# Patient Record
Sex: MALE | Race: BLACK OR AFRICAN AMERICAN | Employment: OTHER | URBAN - METROPOLITAN AREA
[De-identification: names, ages, dates, MRNs, and addresses within clinical notes are randomized per-mention and may not be internally consistent; named-entity substitution may affect disease eponyms.]

---

## 2022-06-23 ENCOUNTER — OFFICE VISIT (OUTPATIENT)
Dept: CARDIOTHORACIC SURGERY | Facility: CLINIC | Age: 67
End: 2022-06-23
Payer: MEDICARE

## 2022-06-23 VITALS
HEIGHT: 73 IN | OXYGEN SATURATION: 98 % | WEIGHT: 208.88 LBS | HEART RATE: 85 BPM | BODY MASS INDEX: 27.68 KG/M2 | DIASTOLIC BLOOD PRESSURE: 83 MMHG | RESPIRATION RATE: 18 BRPM | SYSTOLIC BLOOD PRESSURE: 132 MMHG

## 2022-06-23 DIAGNOSIS — I50.42 CHRONIC COMBINED SYSTOLIC AND DIASTOLIC HEART FAILURE: Primary | ICD-10-CM

## 2022-06-23 PROCEDURE — 99205 OFFICE O/P NEW HI 60 MIN: CPT | Mod: S$GLB,,, | Performed by: THORACIC SURGERY (CARDIOTHORACIC VASCULAR SURGERY)

## 2022-06-23 PROCEDURE — 99205 PR OFFICE/OUTPT VISIT, NEW, LEVL V, 60-74 MIN: ICD-10-PCS | Mod: S$GLB,,, | Performed by: THORACIC SURGERY (CARDIOTHORACIC VASCULAR SURGERY)

## 2022-06-23 RX ORDER — NITROGLYCERIN 0.4 MG/1
TABLET SUBLINGUAL
COMMUNITY
Start: 2022-02-02

## 2022-06-23 RX ORDER — FUROSEMIDE 40 MG/1
40 TABLET ORAL 2 TIMES DAILY
COMMUNITY
Start: 2022-06-07

## 2022-06-23 RX ORDER — METOPROLOL TARTRATE 25 MG/1
12.5 TABLET, FILM COATED ORAL 2 TIMES DAILY
COMMUNITY
Start: 2022-06-20

## 2022-06-23 RX ORDER — ASPIRIN 81 MG/1
81 TABLET ORAL DAILY
COMMUNITY

## 2022-06-23 RX ORDER — FENOFIBRIC ACID 45 MG/1
1 CAPSULE, DELAYED RELEASE ORAL DAILY
COMMUNITY
Start: 2022-06-09

## 2022-06-23 RX ORDER — PANTOPRAZOLE SODIUM 40 MG/1
40 TABLET, DELAYED RELEASE ORAL DAILY
COMMUNITY
Start: 2022-06-09

## 2022-06-23 RX ORDER — AMIODARONE HYDROCHLORIDE 200 MG/1
200 TABLET ORAL DAILY
COMMUNITY
Start: 2022-05-25

## 2022-06-23 RX ORDER — PRAVASTATIN SODIUM 80 MG/1
80 TABLET ORAL NIGHTLY
COMMUNITY
Start: 2022-06-20

## 2022-06-24 NOTE — PROGRESS NOTES
Subjective:      Patient ID: Marbin Sloan is a 66 y.o. male who presents for evaluation of CAD    Chief Complaint: Heart Failure (Left Ventricular Ejection Fraction <30 %)    66 year year old gentleman that presents today for evaluation of CAD and heart failure with ventricular ejection fraction 31%. Patient underwent 2 hospitalizations since April 2022 with GI bleed and onset of Atrial fibulation. Reports during these episodes, he experienced syncope and shortness of breath. Denies chest pain at this time. Reports bilateral lower extremity edema worsening x 1 month. No history of miocardial infarcation or CVA. Mother history of heart disease. Risk factors for coranry artery disease include age, gender, hyperlipidemia, hypertension, and diabetes mellitus.       Review of Systems   Constitutional: Negative.   Eyes: Negative.    Cardiovascular: Positive for leg swelling and syncope.   Respiratory: Negative.    Endocrine: Negative.    Hematologic/Lymphatic: Negative.    Skin: Negative.    Musculoskeletal: Negative.    Psychiatric/Behavioral: Negative.       Past Medical History:   Diagnosis Date    Atrial fibrillation     CAD (coronary artery disease)     DM (diabetes mellitus)     GI bleed 04/2022    HTN (hypertension)     Hyperlipidemia       No past surgical history on file.   Family History   Problem Relation Age of Onset    Heart disease Mother       Social History     Socioeconomic History    Marital status: Unknown   Tobacco Use    Smoking status: Former Smoker     Years: 20.00    Smokeless tobacco: Never Used   Substance and Sexual Activity    Alcohol use: Yes     Alcohol/week: 3.0 - 4.0 standard drinks     Types: 3 - 4 Cans of beer per week     Comment: Week        Medication List with Changes/Refills   Current Medications    AMIODARONE (PACERONE) 200 MG TAB    Take 200 mg by mouth once daily.    ASPIRIN (ECOTRIN) 81 MG EC TABLET    Take 81 mg by mouth once daily.    FENOFIBRIC ACID (FIBRICOR)  "45 MG CPDR    Take 1 capsule by mouth once daily.    FUROSEMIDE (LASIX) 40 MG TABLET    TAKE 1 TABLET BY MOUTH ONCE PER DAY. TAKE AN ADDITIONAL DOSE IN EVENING IF NEEDED FOR SWELLING    METOPROLOL TARTRATE (LOPRESSOR) 25 MG TABLET    Take 12.5 mg by mouth 2 (two) times daily.    NITROGLYCERIN (NITROSTAT) 0.4 MG SL TABLET    PLACE 1 TABLET UNDER TONGUE THREE TIMES A DAY AS NEEDED    PANTOPRAZOLE (PROTONIX) 40 MG TABLET    Take 40 mg by mouth once daily.    PRAVASTATIN (PRAVACHOL) 80 MG TABLET    Take 80 mg by mouth every evening.        Objective:     /83   Pulse 85   Resp 18   Ht 6' 1" (1.854 m)   Wt 94.8 kg (208 lb 14.4 oz)   SpO2 98%   BMI 27.56 kg/m²     Physical Exam  Constitutional:       General: He is not in acute distress.     Appearance: Normal appearance. He is obese. He is not ill-appearing, toxic-appearing or diaphoretic.   HENT:      Head: Normocephalic and atraumatic.      Nose: Nose normal.      Mouth/Throat:      Mouth: Mucous membranes are dry.   Eyes:      Extraocular Movements: Extraocular movements intact.      Conjunctiva/sclera: Conjunctivae normal.      Pupils: Pupils are equal, round, and reactive to light.   Cardiovascular:      Rate and Rhythm: Tachycardia present. Rhythm irregular.      Pulses: Normal pulses.      Heart sounds: Normal heart sounds. No murmur heard.    No gallop.   Pulmonary:      Effort: Pulmonary effort is normal. No respiratory distress.      Breath sounds: Normal breath sounds. No wheezing, rhonchi or rales.   Chest:      Chest wall: No tenderness.   Abdominal:      General: Abdomen is flat. Bowel sounds are normal. There is no distension.      Palpations: Abdomen is soft.      Tenderness: There is no abdominal tenderness. There is no right CVA tenderness or guarding.   Musculoskeletal:         General: No swelling, tenderness, deformity or signs of injury. Normal range of motion.      Cervical back: Normal range of motion.      Right lower leg: No edema.    "   Left lower leg: No edema.   Skin:     General: Skin is warm and dry.      Capillary Refill: Capillary refill takes more than 3 seconds.      Findings: No bruising or erythema.   Neurological:      General: No focal deficit present.      Mental Status: He is alert. Mental status is at baseline. He is disoriented.      Sensory: No sensory deficit.      Motor: No weakness.      Coordination: Coordination normal.      Gait: Gait normal.   Psychiatric:         Mood and Affect: Mood normal.          Labs:  CMP  No results found for: NA, K, CL, CO2, GLU, BUN, CREATININE, CALCIUM, PROT, ALBUMIN, BILITOT, ALKPHOS, AST, ALT, ANIONGAP, ESTGFRAFRICA, EGFRNONAA   No results found for: ALT, AST, GGT, ALKPHOS, BILITOT   No results found for: PTINR, PT  No results found for: WBC, HGB, HCT, MCV, LABPLAT    No results found for: BLOODTYPE    Imaging:  No results found for this or any previous visit.     No results found for this or any previous visit.      No image results found.     No image results found.       No results found for this visit on 06/23/22.   Coronary angiogram:  Diffuse calcified atheromatous lesions but no severe stenosis and it seems not different from the angiogram which was done in September 2021. Left ventricular ejection fraction is 31%  Carotid ultrasound.  That showed place systolic velocity in the right internal carotid artery was 367 cm per secondnd suggestive of greater than 70% stenosis by velocity criteria there was heavy calcification in the bilateral carotid bulbs    Echocardiogram in April 2022:  Severely depressed left ventricular function.  He elevated extraocular 31%.  Right ventricle is normal size, systolic pressure is elevate by Doppler in systole  It was 42 mmHg.  Mitral valve leaflets are normal is moderate to severe mitral regurgitation.  Ideally for static and mobile no stenosis or regurgitation  .    PET scan:  Medium area of moderate tracer attenuation in the posterior wall there is a  partially reversible.  Medium area of tracer attenuation in the anterior wall  Laboratory data:  Glucose 119.  BUN was 17.8.  Creatinine 1.5 .  Electrolytes are within normal limits.  White blood cell count was 7.3 Hey.  Hemoglobin 9.6.  HEMATOCRIT 32.1.  PLATELETS 222.    Pulmonary Functions Testing Results:    No results found for: FEV1, FVC, [id number], TLC, DLCO         Assessment & Plan:     66-YEAR-OLD GENTLEMAN WITH A KNOWN HISTORY OF CORONARY ARTERY DISEASE.  APPARENTLY HAD STENTING DONE IN THE RIGHT CORONARY ARTERY.  NOW HE PRESENTS WITH SHORTNESS OF BREATH.  HE HAD ANGIOGRAM OF THE CORONARIES THAT IN MY READ DOES NOT EXPLAIN HIS OVERALL LV EJECTION FRACTION AND THE SEVERITY OF CORONARY ARTERY DISEASE.  ECHOCARDIOGRAM SHOWED MODERATE TO SEVERE MITRAL REGURGITATION.  I RECOMMENDED SHOULD HAVE THE TRANSESOPHAGEAL ECHOCARDIOGRAM AND EVALUATE HIS MITRAL REGURGITATION.  HIS LV DYSFUNCTION HE IS OUT OF PROPORTION TO CORONARY ARTERY DISEASE.  I THINK AFTER ECHOCARDIOGRAM WHO CAN RECOMMEND OPTIMAL MEDICAL MANAGEMENT AND RE-EVALUATE HIS MITRAL REGURGITATION BEFORE SUBJECTING HIM TO OPEN HEART SURGERY.  REGARDING CAROTID ULTRASOUND, HE HAVE SIGNIFICANT CAROTID ARTERY DISEASE.  He is waiting to get his CT of the neck..  I recommend him to have the transesophageal echocardiogram and manage carotid artery disease non operatively.  Regarding the CT of the neck if it confirms high-grade stenosis in the right carotid artery, I recommend carotid endarterectomy

## 2022-07-18 ENCOUNTER — OFFICE VISIT (OUTPATIENT)
Dept: CARDIOTHORACIC SURGERY | Facility: CLINIC | Age: 67
End: 2022-07-18
Payer: MEDICARE

## 2022-07-18 VITALS
BODY MASS INDEX: 24.25 KG/M2 | HEART RATE: 71 BPM | SYSTOLIC BLOOD PRESSURE: 122 MMHG | WEIGHT: 183 LBS | OXYGEN SATURATION: 95 % | HEIGHT: 73 IN | DIASTOLIC BLOOD PRESSURE: 76 MMHG

## 2022-07-18 DIAGNOSIS — I50.42 CHRONIC COMBINED SYSTOLIC AND DIASTOLIC HEART FAILURE: Primary | ICD-10-CM

## 2022-07-18 PROCEDURE — 99214 OFFICE O/P EST MOD 30 MIN: CPT | Mod: S$GLB,,, | Performed by: THORACIC SURGERY (CARDIOTHORACIC VASCULAR SURGERY)

## 2022-07-18 PROCEDURE — 99214 PR OFFICE/OUTPT VISIT, EST, LEVL IV, 30-39 MIN: ICD-10-PCS | Mod: S$GLB,,, | Performed by: THORACIC SURGERY (CARDIOTHORACIC VASCULAR SURGERY)

## 2022-07-18 NOTE — PROGRESS NOTES
Subjective:      Patient ID: Marbin Sloan is a 66 y.o. male who presents for evaluation of CAD, A-fib and valve disease, Left ventricular EF < 30 %.  Chief Complaint: Valvular Heart Disease, Coronary Artery Disease, Carotid Artery Disease, and Atrial Fibrillation    66 year year old gentleman that presents today for evaluation of CAD and heart failure with ventricular ejection fraction 31%. Patient underwent 2 hospitalizations since April 2022 with GI bleed and onset of Atrial fibulation. Reports during these episodes, he experienced syncope and shortness of breath. Denies chest pain at this time. Reports bilateral lower extremity edema has been stable since his last appointment;  he is still on Lasix.  No history of miocardial infarcation or CVA. Mother history of heart disease. Risk factors for coranry artery disease include age, gender, hyperlipidemia, hypertension, and diabetes mellitus.       Coronary Artery Disease  Pertinent negatives include no leg swelling.   Atrial Fibrillation  Past medical history includes atrial fibrillation and CAD.     Review of Systems   Constitutional: Negative.   HENT: Negative.    Eyes: Negative.    Cardiovascular: Negative.  Negative for leg swelling.   Respiratory: Negative.    Endocrine: Negative.    Skin: Negative.    Musculoskeletal: Negative.    Gastrointestinal: Negative.    Genitourinary: Negative.    Neurological: Negative.    Psychiatric/Behavioral: Negative.       Past Medical History:   Diagnosis Date    Atrial fibrillation     CAD (coronary artery disease)     DM (diabetes mellitus)     GI bleed 04/2022    HTN (hypertension)     Hyperlipidemia       History reviewed. No pertinent surgical history.   Family History   Problem Relation Age of Onset    Heart disease Mother       Social History     Socioeconomic History    Marital status: Unknown   Tobacco Use    Smoking status: Former Smoker     Years: 20.00    Smokeless tobacco: Never Used   Substance and  "Sexual Activity    Alcohol use: Yes     Alcohol/week: 3.0 - 4.0 standard drinks     Types: 3 - 4 Cans of beer per week     Comment: Week        Medication List with Changes/Refills   Current Medications    AMIODARONE (PACERONE) 200 MG TAB    Take 200 mg by mouth once daily.    ASPIRIN (ECOTRIN) 81 MG EC TABLET    Take 81 mg by mouth once daily.    FENOFIBRIC ACID (FIBRICOR) 45 MG CPDR    Take 1 capsule by mouth once daily.    FUROSEMIDE (LASIX) 40 MG TABLET    TAKE 1 TABLET BY MOUTH ONCE PER DAY. TAKE AN ADDITIONAL DOSE IN EVENING IF NEEDED FOR SWELLING    METOPROLOL TARTRATE (LOPRESSOR) 25 MG TABLET    Take 12.5 mg by mouth 2 (two) times daily.    NITROGLYCERIN (NITROSTAT) 0.4 MG SL TABLET    PLACE 1 TABLET UNDER TONGUE THREE TIMES A DAY AS NEEDED    PANTOPRAZOLE (PROTONIX) 40 MG TABLET    Take 40 mg by mouth once daily.    PRAVASTATIN (PRAVACHOL) 80 MG TABLET    Take 80 mg by mouth every evening.        Objective:     /76 (BP Location: Left arm) Comment (BP Location): L  Pulse 71   Ht 6' 1" (1.854 m)   Wt 83 kg (183 lb)   SpO2 95%   BMI 24.14 kg/m²     Physical Exam  Constitutional:       General: He is not in acute distress.     Appearance: Normal appearance. He is obese. He is not ill-appearing, toxic-appearing or diaphoretic.   HENT:      Head: Normocephalic and atraumatic.      Nose: Nose normal.      Mouth/Throat:      Mouth: Mucous membranes are dry.   Eyes:      Extraocular Movements: Extraocular movements intact.      Conjunctiva/sclera: Conjunctivae normal.      Pupils: Pupils are equal, round, and reactive to light.   Cardiovascular:      Rate and Rhythm: Tachycardia present. Rhythm irregular.      Pulses: Normal pulses.      Heart sounds: Normal heart sounds. No murmur heard.    No gallop.   Pulmonary:      Effort: Pulmonary effort is normal. No respiratory distress.      Breath sounds: Normal breath sounds. No wheezing, rhonchi or rales.   Chest:      Chest wall: No tenderness.   Abdominal: "      General: Abdomen is flat. Bowel sounds are normal. There is no distension.      Palpations: Abdomen is soft.      Tenderness: There is no abdominal tenderness. There is no right CVA tenderness or guarding.   Musculoskeletal:         General: No swelling, tenderness, deformity or signs of injury. Normal range of motion.      Cervical back: Normal range of motion.      Right lower leg: No edema.      Left lower leg: No edema.   Skin:     General: Skin is warm and dry.      Capillary Refill: Capillary refill takes more than 3 seconds.      Findings: No bruising or erythema.   Neurological:      General: No focal deficit present.      Mental Status: He is alert. Mental status is at baseline. He is disoriented.      Sensory: No sensory deficit.      Motor: No weakness.      Coordination: Coordination normal.      Gait: Gait normal.   Psychiatric:         Mood and Affect: Mood normal.          Labs:  CMP  No results found for: NA, K, CL, CO2, GLU, BUN, CREATININE, CALCIUM, PROT, ALBUMIN, BILITOT, ALKPHOS, AST, ALT, ANIONGAP, ESTGFRAFRICA, EGFRNONAA   No results found for: ALT, AST, GGT, ALKPHOS, BILITOT   No results found for: PTINR, PT  No results found for: WBC, HGB, HCT, MCV, LABPLAT    No results found for: BLOODTYPE    Imaging:   JO 7/13/22 : Rhythm is normal sinus. Left ventricle appears dilated with mildly depressed systolic function and mild global hypokinesis, EF estimated at 40-50 %.  Left atrium appears dilated , left atrial appendage has a windsock morphology.  Moderate to severe Mitral regurgitation with 3 or 4 separate jets is seen.  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     TTE 4/2022:  Severely depressed LV functin. He elevated extraocular 31 %. RV is normal in size, systolic function is elevated by doppler in systole at 42 mmHg. Mitral valve leaflets are normal , moderate to severe MR. Ideally for stateic and mobile no stenosis or regurgitation.     [facility]  9/3/21: Diffuse calcified atheroma that causes no severe stenosis. Suspect PET scan abnormalities are due to small caliber PD and closure of the small obtuse marginal 1branch. Hemodynamically nonsignificant IFR ot the ostial LAD. LV diastolic dysfunction.    Carotid U/S 6/22/22: Left Internal carotid stenosis > 70 %,  Left external carotid artery velocity is IC/CC Ratio: 3.71.  Right Internal Carotid artery > 70 %,  Right external artery velocity : IC/CC Ratio: 5.25     Pulmonary Functions Testing Results:    Cardiac PET Scan 8/16/21: Moderately depressed LV function that is likely ischemic given anterior and posterior perfusion defects. Recommend evaluation for potentially multivessel CAD.    No results found for: FEV1, FVC, [id number], TLC, DLCO         Assessment & Plan:     66-year-old Afro-American gentleman with progressively getting short of breath.  He is in [place]  heart association functional class 3. Is the left shoulder dysfunction is definite out of proportion to his coronary artery disease.  It has been discussed with his cardiologist. He definitely comes in at high risk patient for mitral valve repair and coronary artery bypass grafting .  The risks and benefits of surgery were explained the patient.  The risks include but are not limited to bleeding, infection, renal failure, pulmonary failure, stroke and death.  He understood all risks and wished to pursue surgery           Randa Gann RN

## 2022-07-19 DIAGNOSIS — I48.0 PAROXYSMAL ATRIAL FIBRILLATION: ICD-10-CM

## 2022-07-19 DIAGNOSIS — I05.9 MITRAL VALVE DISEASE: ICD-10-CM

## 2022-07-19 DIAGNOSIS — I50.42 CHRONIC COMBINED SYSTOLIC AND DIASTOLIC HEART FAILURE: ICD-10-CM

## 2022-07-19 DIAGNOSIS — I25.10 CORONARY ARTERY DISEASE, UNSPECIFIED VESSEL OR LESION TYPE, UNSPECIFIED WHETHER ANGINA PRESENT, UNSPECIFIED WHETHER NATIVE OR TRANSPLANTED HEART: ICD-10-CM

## 2022-07-20 ENCOUNTER — TELEPHONE (OUTPATIENT)
Dept: CARDIOTHORACIC SURGERY | Facility: CLINIC | Age: 67
End: 2022-07-20
Payer: MEDICARE

## 2022-07-20 NOTE — TELEPHONE ENCOUNTER
----- Message from Mis Lerma sent at 7/20/2022  7:58 AM CDT -----  Contact: PT    ----- Message -----  From: Elana Ho  Sent: 7/19/2022  11:19 AM CDT  To: [facility] Staff        Who Called: Marbin       Does the patient know what this is regarding?: schedule surgery     Would the patient rather a call back or a response via [facility]? Callback     Best Call Back Number  [phone] (home)

## 2022-07-25 ENCOUNTER — OUTSIDE PLACE OF SERVICE (OUTPATIENT)
Dept: CARDIOTHORACIC SURGERY | Facility: CLINIC | Age: 67
End: 2022-07-25
Payer: MEDICARE

## 2022-07-25 PROCEDURE — 33508 PR ENDOSCOPY W/VIDEO-ASST VEIN HARVEST,CABG: ICD-10-PCS | Mod: 59,,, | Performed by: THORACIC SURGERY (CARDIOTHORACIC VASCULAR SURGERY)

## 2022-07-25 PROCEDURE — 33430 REPLACEMENT OF MITRAL VALVE: CPT | Mod: ,,, | Performed by: THORACIC SURGERY (CARDIOTHORACIC VASCULAR SURGERY)

## 2022-07-25 PROCEDURE — 33518 CABG ARTERY-VEIN TWO: CPT | Mod: ,,, | Performed by: THORACIC SURGERY (CARDIOTHORACIC VASCULAR SURGERY)

## 2022-07-25 PROCEDURE — 33508 ENDOSCOPIC VEIN HARVEST: CPT | Mod: 59,,, | Performed by: THORACIC SURGERY (CARDIOTHORACIC VASCULAR SURGERY)

## 2022-07-25 PROCEDURE — 33533 PR CABG, ARTERIAL, SINGLE: ICD-10-PCS | Mod: 51,,, | Performed by: THORACIC SURGERY (CARDIOTHORACIC VASCULAR SURGERY)

## 2022-07-25 PROCEDURE — 33430 PR REPLACEMENT OF MITRAL VALVE: ICD-10-PCS | Mod: ,,, | Performed by: THORACIC SURGERY (CARDIOTHORACIC VASCULAR SURGERY)

## 2022-07-25 PROCEDURE — 33518 PR CABG, ARTERY-VEIN, TWO: ICD-10-PCS | Mod: ,,, | Performed by: THORACIC SURGERY (CARDIOTHORACIC VASCULAR SURGERY)

## 2022-07-25 PROCEDURE — 33533 CABG ARTERIAL SINGLE: CPT | Mod: 51,,, | Performed by: THORACIC SURGERY (CARDIOTHORACIC VASCULAR SURGERY)

## 2022-07-29 ENCOUNTER — OUTSIDE PLACE OF SERVICE (OUTPATIENT)
Dept: CARDIOTHORACIC SURGERY | Facility: CLINIC | Age: 67
End: 2022-07-29
Payer: MEDICARE

## 2022-07-29 PROCEDURE — 99024 POSTOP FOLLOW-UP VISIT: CPT | Mod: ,,, | Performed by: THORACIC SURGERY (CARDIOTHORACIC VASCULAR SURGERY)

## 2022-07-29 PROCEDURE — 99024 PR POST-OP FOLLOW-UP VISIT: ICD-10-PCS | Mod: ,,, | Performed by: THORACIC SURGERY (CARDIOTHORACIC VASCULAR SURGERY)

## 2022-07-30 ENCOUNTER — OUTSIDE PLACE OF SERVICE (OUTPATIENT)
Dept: CARDIOTHORACIC SURGERY | Facility: CLINIC | Age: 67
End: 2022-07-30
Payer: MEDICARE

## 2022-07-30 PROCEDURE — 99024 POSTOP FOLLOW-UP VISIT: CPT | Mod: ,,, | Performed by: THORACIC SURGERY (CARDIOTHORACIC VASCULAR SURGERY)

## 2022-07-30 PROCEDURE — 99024 PR POST-OP FOLLOW-UP VISIT: ICD-10-PCS | Mod: ,,, | Performed by: THORACIC SURGERY (CARDIOTHORACIC VASCULAR SURGERY)

## 2022-07-31 ENCOUNTER — OUTSIDE PLACE OF SERVICE (OUTPATIENT)
Dept: CARDIOTHORACIC SURGERY | Facility: CLINIC | Age: 67
End: 2022-07-31
Payer: MEDICARE

## 2022-07-31 PROCEDURE — 99024 POSTOP FOLLOW-UP VISIT: CPT | Mod: ,,, | Performed by: THORACIC SURGERY (CARDIOTHORACIC VASCULAR SURGERY)

## 2022-07-31 PROCEDURE — 99024 PR POST-OP FOLLOW-UP VISIT: ICD-10-PCS | Mod: ,,, | Performed by: THORACIC SURGERY (CARDIOTHORACIC VASCULAR SURGERY)

## 2022-08-01 ENCOUNTER — OUTSIDE PLACE OF SERVICE (OUTPATIENT)
Dept: CARDIOTHORACIC SURGERY | Facility: CLINIC | Age: 67
End: 2022-08-01
Payer: MEDICARE

## 2022-08-01 PROCEDURE — 99024 POSTOP FOLLOW-UP VISIT: CPT | Mod: ,,, | Performed by: THORACIC SURGERY (CARDIOTHORACIC VASCULAR SURGERY)

## 2022-08-01 PROCEDURE — 99024 PR POST-OP FOLLOW-UP VISIT: ICD-10-PCS | Mod: ,,, | Performed by: THORACIC SURGERY (CARDIOTHORACIC VASCULAR SURGERY)

## 2022-08-02 ENCOUNTER — OUTSIDE PLACE OF SERVICE (OUTPATIENT)
Dept: CARDIOTHORACIC SURGERY | Facility: CLINIC | Age: 67
End: 2022-08-02
Payer: MEDICARE

## 2022-08-02 PROCEDURE — 99024 POSTOP FOLLOW-UP VISIT: CPT | Mod: ,,, | Performed by: THORACIC SURGERY (CARDIOTHORACIC VASCULAR SURGERY)

## 2022-08-02 PROCEDURE — 99024 PR POST-OP FOLLOW-UP VISIT: ICD-10-PCS | Mod: ,,, | Performed by: THORACIC SURGERY (CARDIOTHORACIC VASCULAR SURGERY)

## 2022-08-03 ENCOUNTER — OUTSIDE PLACE OF SERVICE (OUTPATIENT)
Dept: CARDIOTHORACIC SURGERY | Facility: CLINIC | Age: 67
End: 2022-08-03
Payer: MEDICARE

## 2022-08-03 PROCEDURE — 99024 PR POST-OP FOLLOW-UP VISIT: ICD-10-PCS | Mod: ,,, | Performed by: THORACIC SURGERY (CARDIOTHORACIC VASCULAR SURGERY)

## 2022-08-03 PROCEDURE — 99024 POSTOP FOLLOW-UP VISIT: CPT | Mod: ,,, | Performed by: THORACIC SURGERY (CARDIOTHORACIC VASCULAR SURGERY)

## 2022-08-04 ENCOUNTER — OUTSIDE PLACE OF SERVICE (OUTPATIENT)
Dept: CARDIOTHORACIC SURGERY | Facility: CLINIC | Age: 67
End: 2022-08-04
Payer: MEDICARE

## 2022-08-04 PROCEDURE — 99024 POSTOP FOLLOW-UP VISIT: CPT | Mod: ,,, | Performed by: THORACIC SURGERY (CARDIOTHORACIC VASCULAR SURGERY)

## 2022-08-04 PROCEDURE — 99024 PR POST-OP FOLLOW-UP VISIT: ICD-10-PCS | Mod: ,,, | Performed by: THORACIC SURGERY (CARDIOTHORACIC VASCULAR SURGERY)

## 2022-08-05 ENCOUNTER — OUTSIDE PLACE OF SERVICE (OUTPATIENT)
Dept: CARDIOTHORACIC SURGERY | Facility: CLINIC | Age: 67
End: 2022-08-05
Payer: MEDICARE

## 2022-08-05 PROCEDURE — 99024 PR POST-OP FOLLOW-UP VISIT: ICD-10-PCS | Mod: ,,, | Performed by: THORACIC SURGERY (CARDIOTHORACIC VASCULAR SURGERY)

## 2022-08-05 PROCEDURE — 99024 POSTOP FOLLOW-UP VISIT: CPT | Mod: ,,, | Performed by: THORACIC SURGERY (CARDIOTHORACIC VASCULAR SURGERY)

## 2022-08-06 ENCOUNTER — OUTSIDE PLACE OF SERVICE (OUTPATIENT)
Dept: CARDIOTHORACIC SURGERY | Facility: CLINIC | Age: 67
End: 2022-08-06
Payer: MEDICARE

## 2022-08-06 PROCEDURE — 99024 POSTOP FOLLOW-UP VISIT: CPT | Mod: ,,, | Performed by: THORACIC SURGERY (CARDIOTHORACIC VASCULAR SURGERY)

## 2022-08-06 PROCEDURE — 99024 PR POST-OP FOLLOW-UP VISIT: ICD-10-PCS | Mod: ,,, | Performed by: THORACIC SURGERY (CARDIOTHORACIC VASCULAR SURGERY)

## 2022-08-07 ENCOUNTER — OUTSIDE PLACE OF SERVICE (OUTPATIENT)
Dept: CARDIOTHORACIC SURGERY | Facility: CLINIC | Age: 67
End: 2022-08-07
Payer: MEDICARE

## 2022-08-07 PROCEDURE — 99024 POSTOP FOLLOW-UP VISIT: CPT | Mod: ,,, | Performed by: THORACIC SURGERY (CARDIOTHORACIC VASCULAR SURGERY)

## 2022-08-07 PROCEDURE — 99024 PR POST-OP FOLLOW-UP VISIT: ICD-10-PCS | Mod: ,,, | Performed by: THORACIC SURGERY (CARDIOTHORACIC VASCULAR SURGERY)

## 2022-08-08 ENCOUNTER — OUTSIDE PLACE OF SERVICE (OUTPATIENT)
Dept: CARDIOTHORACIC SURGERY | Facility: CLINIC | Age: 67
End: 2022-08-08
Payer: MEDICARE

## 2022-08-08 PROCEDURE — 99024 POSTOP FOLLOW-UP VISIT: CPT | Mod: ,,, | Performed by: THORACIC SURGERY (CARDIOTHORACIC VASCULAR SURGERY)

## 2022-08-08 PROCEDURE — 99024 PR POST-OP FOLLOW-UP VISIT: ICD-10-PCS | Mod: ,,, | Performed by: THORACIC SURGERY (CARDIOTHORACIC VASCULAR SURGERY)

## 2022-08-09 ENCOUNTER — OUTSIDE PLACE OF SERVICE (OUTPATIENT)
Dept: CARDIOTHORACIC SURGERY | Facility: CLINIC | Age: 67
End: 2022-08-09
Payer: MEDICARE

## 2022-08-09 PROCEDURE — 99024 POSTOP FOLLOW-UP VISIT: CPT | Mod: ,,, | Performed by: THORACIC SURGERY (CARDIOTHORACIC VASCULAR SURGERY)

## 2022-08-09 PROCEDURE — 99024 PR POST-OP FOLLOW-UP VISIT: ICD-10-PCS | Mod: ,,, | Performed by: THORACIC SURGERY (CARDIOTHORACIC VASCULAR SURGERY)

## 2022-08-10 ENCOUNTER — OUTSIDE PLACE OF SERVICE (OUTPATIENT)
Dept: CARDIOTHORACIC SURGERY | Facility: CLINIC | Age: 67
End: 2022-08-10
Payer: MEDICARE

## 2022-08-10 PROCEDURE — 99024 PR POST-OP FOLLOW-UP VISIT: ICD-10-PCS | Mod: ,,, | Performed by: THORACIC SURGERY (CARDIOTHORACIC VASCULAR SURGERY)

## 2022-08-10 PROCEDURE — 99024 POSTOP FOLLOW-UP VISIT: CPT | Mod: ,,, | Performed by: THORACIC SURGERY (CARDIOTHORACIC VASCULAR SURGERY)

## 2022-08-11 ENCOUNTER — OUTSIDE PLACE OF SERVICE (OUTPATIENT)
Dept: CARDIOTHORACIC SURGERY | Facility: CLINIC | Age: 67
End: 2022-08-11
Payer: MEDICARE

## 2022-08-11 ENCOUNTER — OUTSIDE PLACE OF SERVICE (OUTPATIENT)
Dept: CARDIOTHORACIC SURGERY | Facility: CLINIC | Age: 67
End: 2022-08-11

## 2022-08-11 PROCEDURE — 99024 PR POST-OP FOLLOW-UP VISIT: ICD-10-PCS | Mod: ,,, | Performed by: THORACIC SURGERY (CARDIOTHORACIC VASCULAR SURGERY)

## 2022-08-11 PROCEDURE — 99024 POSTOP FOLLOW-UP VISIT: CPT | Mod: ,,, | Performed by: THORACIC SURGERY (CARDIOTHORACIC VASCULAR SURGERY)

## 2022-08-19 ENCOUNTER — OUTSIDE PLACE OF SERVICE (OUTPATIENT)
Dept: CARDIOTHORACIC SURGERY | Facility: CLINIC | Age: 67
End: 2022-08-19
Payer: MEDICARE

## 2022-08-25 ENCOUNTER — TELEPHONE (OUTPATIENT)
Dept: CARDIOTHORACIC SURGERY | Facility: CLINIC | Age: 67
End: 2022-08-25
Payer: MEDICARE

## 2022-08-25 NOTE — TELEPHONE ENCOUNTER
----- Message from Wally Wolff sent at 8/25/2022 11:20 AM CDT -----  Contact: [facility]  Please call Yee from [facility] regarding patient referral and checking patient stats and vitals        Please call ASAP      Call back # [phone]

## 2022-08-31 ENCOUNTER — HOSPITAL ENCOUNTER (OUTPATIENT)
Dept: RADIOLOGY | Facility: CLINIC | Age: 67
Discharge: HOME | End: 2022-08-31
Attending: THORACIC SURGERY (CARDIOTHORACIC VASCULAR SURGERY)
Payer: MEDICARE

## 2022-08-31 ENCOUNTER — OFFICE VISIT (OUTPATIENT)
Dept: CARDIOTHORACIC SURGERY | Facility: CLINIC | Age: 67
End: 2022-08-31
Payer: MEDICARE

## 2022-08-31 VITALS
BODY MASS INDEX: 28.23 KG/M2 | HEART RATE: 92 BPM | WEIGHT: 214 LBS | DIASTOLIC BLOOD PRESSURE: 60 MMHG | OXYGEN SATURATION: 97 % | SYSTOLIC BLOOD PRESSURE: 94 MMHG

## 2022-08-31 DIAGNOSIS — I50.42 CHRONIC COMBINED SYSTOLIC AND DIASTOLIC HEART FAILURE: Primary | ICD-10-CM

## 2022-08-31 DIAGNOSIS — I50.42 CHRONIC COMBINED SYSTOLIC AND DIASTOLIC HEART FAILURE: ICD-10-CM

## 2022-08-31 PROCEDURE — 99024 PR POST-OP FOLLOW-UP VISIT: ICD-10-PCS | Mod: S$GLB,,, | Performed by: THORACIC SURGERY (CARDIOTHORACIC VASCULAR SURGERY)

## 2022-08-31 PROCEDURE — 71046 XR CHEST PA AND LATERAL: ICD-10-PCS | Mod: 26,,, | Performed by: RADIOLOGY

## 2022-08-31 PROCEDURE — 99024 POSTOP FOLLOW-UP VISIT: CPT | Mod: S$GLB,,, | Performed by: THORACIC SURGERY (CARDIOTHORACIC VASCULAR SURGERY)

## 2022-08-31 PROCEDURE — 71046 X-RAY EXAM CHEST 2 VIEWS: CPT | Mod: 26,,, | Performed by: RADIOLOGY

## 2022-08-31 RX ORDER — OXYCODONE HYDROCHLORIDE 5 MG/1
5 CAPSULE ORAL EVERY 6 HOURS PRN
COMMUNITY

## 2022-08-31 RX ORDER — SPIRONOLACTONE 25 MG/1
25 TABLET ORAL DAILY
COMMUNITY
Start: 2022-08-31

## 2022-08-31 RX ORDER — METOLAZONE 5 MG/1
5 TABLET ORAL DAILY
COMMUNITY
Start: 2022-08-31

## 2022-08-31 RX ORDER — FERROUS SULFATE 325(65) MG
325 TABLET, DELAYED RELEASE (ENTERIC COATED) ORAL
COMMUNITY

## 2022-08-31 RX ORDER — WARFARIN 4 MG/1
4 TABLET ORAL DAILY
COMMUNITY

## 2022-08-31 RX ORDER — DIGOXIN 125 MCG
125 TABLET ORAL DAILY
COMMUNITY

## 2022-08-31 RX ORDER — CARVEDILOL 3.12 MG/1
3.12 TABLET ORAL 2 TIMES DAILY WITH MEALS
Qty: 60 TABLET | Refills: 11 | Status: CANCELLED | OUTPATIENT
Start: 2022-08-31 | End: 2023-08-31

## 2022-08-31 NOTE — PROGRESS NOTES
..  Subjective:      Patient ID: Marbin Sloan is a 66 y.o. male who presents accompanied by his daughter for post -op evaluation.     Chief Complaint: Post-op Evaluation (Post op , was discharged from Rehab on 8/24/22. )    66 year year old gentleman that presents today accompanied by his daughter for post-op evaluation . He is in a wheelchair today, was discharged from physical rehab on  8/24/22.  Had a CABG and MVR on 7/28/22. History also includes  CAD and heart failure with ventricular ejection fraction 31%. Had A-fib with RVR on 7/30/22 and alcohol withdrawal with detox protocol during his recent hospital stay. Patient underwent 2 hospitalizations since April 2022 with GI bleed and onset of Atrial fibulation. Reports he is doing well now and is staying with his daughter.  Denies chest pain , SOB or fatigue. Reports bilateral lower extremity edema , he is on Lasix 40 mg twice a day since discharged.   No history of myocardial infarcation or CVA. Mother history of heart disease. Risk factors for coranry artery disease include age, gender, hyperlipidemia, hypertension, and diabetes mellitus.       Coronary Artery Disease  Pertinent negatives include no chest pain, dizziness, leg swelling, muscle weakness, palpitations, shortness of breath or weight gain.   Atrial Fibrillation  Symptoms are negative for chest pain, dizziness, palpitations, shortness of breath, syncope and weakness. Past medical history includes atrial fibrillation and CAD. Review of Systems   Constitutional: Negative. Negative for chills, decreased appetite, diaphoresis, fever, malaise/fatigue, night sweats, weight gain and weight loss.   HENT:  Negative for congestion, ear discharge, ear pain, hearing loss, hoarse voice, nosebleeds, odynophagia, sore throat and tinnitus.    Eyes:  Negative for blurred vision, discharge, double vision, pain, photophobia, redness, vision loss in left eye, vision loss in right eye, visual disturbance and visual  halos.   Cardiovascular:  Negative for chest pain, claudication, cyanosis, dyspnea on exertion, irregular heartbeat, leg swelling, near-syncope, orthopnea, palpitations, paroxysmal nocturnal dyspnea and syncope.   Respiratory:  Negative for cough, hemoptysis, shortness of breath, sleep disturbances due to breathing, snoring and sputum production.    Endocrine: Negative for cold intolerance, heat intolerance, polydipsia, polyphagia and polyuria.   Hematologic/Lymphatic: Negative for adenopathy and bleeding problem. Does not bruise/bleed easily.   Skin:  Negative for color change, dry skin, flushing, itching, nail changes, poor wound healing, rash, skin cancer, suspicious lesions and unusual hair distribution.   Musculoskeletal:  Negative for arthritis, back pain, falls, gout, joint pain, joint swelling, muscle cramps, muscle weakness, myalgias, neck pain and stiffness.   Gastrointestinal:  Negative for bloating, abdominal pain, anorexia, change in bowel habit, bowel incontinence, constipation, diarrhea, dysphagia, excessive appetite, flatus, heartburn, hematemesis, hematochezia, hemorrhoids, jaundice, melena, nausea and vomiting.   Genitourinary:  Negative for bladder incontinence, decreased libido, dysuria, flank pain, frequency, genital sores, hematuria, hesitancy, incomplete emptying, menorrhagia, missed menses, nocturia, non-menstrual bleeding, pelvic pain and urgency.   Neurological:  Negative for aphonia, brief paralysis, difficulty with concentration, disturbances in coordination, excessive daytime sleepiness, dizziness, focal weakness, headaches, light-headedness, loss of balance, numbness, paresthesias, seizures, sensory change, tremors, vertigo and weakness.   Psychiatric/Behavioral:  Negative for altered mental status, depression, hallucinations, hypervigilance, memory loss, substance abuse, suicidal ideas and thoughts of violence. The patient does not have insomnia and is not nervous/anxious.     Allergic/Immunologic: Negative for environmental allergies, HIV exposure, hives and persistent infections.    Past Medical History:   Diagnosis Date    Anemia, unspecified     post op CABG    Atrial fibrillation     CAD (coronary artery disease)     CHF (congestive heart failure)     Debility     post op CABG    DM (diabetes mellitus)     GI bleed 04/2022    HTN (hypertension)     Hx of CABG 07/28/2022    CABG x 3    Hyperlipidemia       Past Surgical History:   Procedure Laterality Date    CARDIAC VALVE SURGERY  07/28/2022    MVR      Family History   Problem Relation Age of Onset    Heart disease Mother       Social History     Socioeconomic History    Marital status: Unknown   Tobacco Use    Smoking status: Former     Years: 20.00     Types: Cigarettes    Smokeless tobacco: Never   Substance and Sexual Activity    Alcohol use: Not Currently     Comment: less then previous ( per dtr)        Medication List with Changes/Refills   Current Medications    AMIODARONE (PACERONE) 200 MG TAB    Take 200 mg by mouth once daily.    ASPIRIN (ECOTRIN) 81 MG EC TABLET    Take 81 mg by mouth once daily.    DIGOXIN (LANOXIN) 125 MCG TABLET    Take 125 mcg by mouth once daily.    FENOFIBRIC ACID (FIBRICOR) 45 MG CPDR    Take 1 capsule by mouth once daily.    FERROUS SULFATE 325 (65 FE) MG EC TABLET    Take 325 mg by mouth 3 (three) times daily with meals.    FUROSEMIDE (LASIX) 40 MG TABLET    40 mg 2 (two) times a day.    METOLAZONE (ZAROXOLYN) 5 MG TABLET    Take 5 mg by mouth once daily. Take one tablet on Monday, wed , and Friday # 10 x 0    METOPROLOL TARTRATE (LOPRESSOR) 25 MG TABLET    Take 12.5 mg by mouth 2 (two) times daily.    NITROGLYCERIN (NITROSTAT) 0.4 MG SL TABLET    PLACE 1 TABLET UNDER TONGUE THREE TIMES A DAY AS NEEDED    OXYCODONE (OXY-IR) 5 MG CAP    Take 5 mg by mouth every 6 (six) hours as needed for Pain.    PANTOPRAZOLE (PROTONIX) 40 MG TABLET    Take 40 mg by mouth once daily.    PRAVASTATIN (PRAVACHOL)  80 MG TABLET    Take 80 mg by mouth every evening.    SPIRONOLACTONE (ALDACTONE) 25 MG TABLET    Take 25 mg by mouth once daily. Take 1/2 tablet daily    WARFARIN (COUMADIN) 4 MG TABLET    Take 4 mg by mouth once daily. As directed        Objective:     BP 94/60 (BP Location: Left arm)   Pulse 92   Wt 97.1 kg (214 lb)   SpO2 97%   BMI 28.23 kg/m²     Physical Exam  Constitutional:       General: He is not in acute distress.     Appearance: Normal appearance. He is obese. He is not ill-appearing, toxic-appearing or diaphoretic.   HENT:      Head: Normocephalic and atraumatic.      Nose: Nose normal.      Mouth/Throat:      Mouth: Mucous membranes are dry.   Eyes:      Extraocular Movements: Extraocular movements intact.      Conjunctiva/sclera: Conjunctivae normal.      Pupils: Pupils are equal, round, and reactive to light.   Cardiovascular:      Rate and Rhythm: Tachycardia present. Rhythm irregular.      Pulses: Normal pulses.      Heart sounds: Normal heart sounds. No murmur heard.    No gallop.   Pulmonary:      Effort: Pulmonary effort is normal. No respiratory distress.      Breath sounds: Normal breath sounds. No wheezing, rhonchi or rales.   Chest:      Chest wall: No tenderness.   Abdominal:      General: Abdomen is flat. Bowel sounds are normal. There is no distension.      Palpations: Abdomen is soft.      Tenderness: There is no abdominal tenderness. There is no right CVA tenderness or guarding.   Musculoskeletal:         General: No swelling, tenderness, deformity or signs of injury. Normal range of motion.      Cervical back: Normal range of motion.      Right lower leg: No edema.      Left lower leg: No edema.   Skin:     General: Skin is warm and dry.      Capillary Refill: Capillary refill takes more than 3 seconds.      Findings: No bruising or erythema.   Neurological:      General: No focal deficit present.      Mental Status: He is alert. Mental status is at baseline. He is disoriented.       Sensory: No sensory deficit.      Motor: No weakness.      Coordination: Coordination normal.      Gait: Gait normal.   Psychiatric:         Mood and Affect: Mood normal.        Labs: 8/23/22: WBC 8.8, RBC 3.3, HGB 8.1, HCT 28.4, , PT 28.3, INR 2.5, , K 4.4, , BUN 37, CR 1.6, GFR 53, CA 9.2, , MG 2.2        X-Ray Chest PA And Lateral  Narrative: EXAMINATION:  XR CHEST PA AND LATERAL    CLINICAL HISTORY:  Chronic combined systolic (congestive) and diastolic (congestive) heart failure    TECHNIQUE:  PA and lateral views of the chest were performed.    COMPARISON:  None    FINDINGS:  Cardiopericardial silhouette is enlarged.  Slight congested appearance of the central pulmonary vasculature.  Bilateral small pleural effusions are seen.  Mild prominence of the pulmonary interstitium suggestive of mild volume overload/edema.  No confluent airspace disease.  Degenerative changes of the shoulders and thoracic spine.  Postsurgical changes of previous median sternotomy and intracardiac device.  Impression: As above    Electronically signed by: John Ballesteros MD  Date:    08/31/2022  Time:    16:02       Assessment & Plan:   66n yrold with h/o mitral valve repair and CABG. His EF was out of proportion to his CAD. It was felt It could be due to alcoholic cardiomyopathy. He has 3+ pedal edema. Incisions are healing well. Leg wound is still very raw. Added Metalazone to his diurectics. Plan to have follow uo in 1 week           Randa Gann RN

## 2022-09-12 ENCOUNTER — TELEPHONE (OUTPATIENT)
Dept: CARDIOTHORACIC SURGERY | Facility: CLINIC | Age: 67
End: 2022-09-12
Payer: MEDICARE

## 2022-09-12 NOTE — TELEPHONE ENCOUNTER
Informed patient we can get his labs from [facility].  He has a follow up with his cardiologist and his PCP this week .

## 2022-09-22 ENCOUNTER — OFFICE VISIT (OUTPATIENT)
Dept: CARDIOTHORACIC SURGERY | Facility: CLINIC | Age: 67
End: 2022-09-22
Payer: MEDICARE

## 2022-09-22 VITALS
WEIGHT: 177 LBS | HEART RATE: 97 BPM | DIASTOLIC BLOOD PRESSURE: 52 MMHG | BODY MASS INDEX: 23.35 KG/M2 | SYSTOLIC BLOOD PRESSURE: 97 MMHG | OXYGEN SATURATION: 100 %

## 2022-09-22 DIAGNOSIS — I50.42 CHRONIC COMBINED SYSTOLIC AND DIASTOLIC HEART FAILURE: Primary | ICD-10-CM

## 2022-09-22 PROCEDURE — 99024 PR POST-OP FOLLOW-UP VISIT: ICD-10-PCS | Mod: S$GLB,,, | Performed by: THORACIC SURGERY (CARDIOTHORACIC VASCULAR SURGERY)

## 2022-09-22 PROCEDURE — 99024 POSTOP FOLLOW-UP VISIT: CPT | Mod: S$GLB,,, | Performed by: THORACIC SURGERY (CARDIOTHORACIC VASCULAR SURGERY)

## 2023-07-07 ENCOUNTER — TELEPHONE (OUTPATIENT)
Dept: CARDIOTHORACIC SURGERY | Facility: CLINIC | Age: 68
End: 2023-07-07
Payer: MEDICARE

## 2023-07-07 NOTE — TELEPHONE ENCOUNTER
Pt has not been seen in over a year, Advised pt to call PCP   ----- Message from Madison Barroso sent at 7/7/2023  8:33 AM CDT -----  Contact: self  Pt is calling for a referral to a DR. Thakkar at [facility] . PT has a URI and is asking that it be sent asap if possible for him to be seen sooner . Pt was at the ER on yesterday and was told to have a referral for him to be seen sooner. Please call pt at [phone]

## 2023-09-28 ENCOUNTER — DOCUMENT SCAN (OUTPATIENT)
Dept: HOME HEALTH SERVICES | Facility: HOSPITAL | Age: 68
End: 2023-09-28
Payer: MEDICARE

## 2023-11-29 ENCOUNTER — DOCUMENT SCAN (OUTPATIENT)
Dept: HOME HEALTH SERVICES | Facility: HOSPITAL | Age: 68
End: 2023-11-29
Payer: MEDICARE